# Patient Record
(demographics unavailable — no encounter records)

---

## 2024-10-14 NOTE — HISTORY OF PRESENT ILLNESS
[Patient reported PAP Smear was normal] : Patient reported PAP Smear was normal [Normal Amount/Duration] :  normal amount and duration [Patient refuses STI testing] : Patient refuses STI testing [FreeTextEntry1] : 35 y/o  female, LMP: 10/7/24, presents as new patient for problem visit: c/o urinary urgency, frequency, pelvic pressure, and dysuria.  Went to urgent care and gyn 2 mths ago for similar complaints and was treated for suspected UTI and BV.  BV treated w flagyl and reports relief but some symptoms still lingering.     Menstrual Cycle: regular, monthly, mild pain and bleeding. Sexual Activity: monogamous with male partner. Contraception: occasional condoms Social/Mental Health: reports social ETOH, denies tobacco or any illicit drug use.  h/o depression and anxiety.  Denies thoughts of personal harm or suicidal ideation.  ROS:  Denies fever/chills, HA, Cough/sore throat, CP, SOB, N/V, Diarrhea/Constipation, Pelvic pain, Urinary frequency/urgency/incontinence, irregular vaginal bleeding, discharge or irritation.    Medical History GYN HX: c/section 3/2019-arrest/NRFHT PMH: depression and anxiety PSH: c/section Meds: lexapro, xanax Allergies: NKDA [PapSmeardate] : 8/2024

## 2024-10-14 NOTE — DISCUSSION/SUMMARY
[FreeTextEntry1] : will await cults for possible treatment. ucg neg  tvus referred to urogyn. patient verbalized understanding.

## 2024-10-14 NOTE — PHYSICAL EXAM
[Chaperone Present] : A chaperone was present in the examining room during all aspects of the physical examination [[id number]] : A chaperone was present during the pelvic exam. [FreeTextEntry2] : Janet MAYORGA [Appropriately responsive] : appropriately responsive [Alert] : alert [No Acute Distress] : no acute distress [Soft] : soft [Non-tender] : non-tender [Non-distended] : non-distended [No Lesions] : no lesions [No Mass] : no mass [Oriented x3] : oriented x3 [Labia Majora] : normal [Labia Minora] : normal [Discharge] : discharge [Scant] : scant [Wendi] : yellow [Thick] : thick [Normal] : normal [Uterine Adnexae] : normal